# Patient Record
Sex: MALE | URBAN - METROPOLITAN AREA
[De-identification: names, ages, dates, MRNs, and addresses within clinical notes are randomized per-mention and may not be internally consistent; named-entity substitution may affect disease eponyms.]

---

## 2022-10-24 ENCOUNTER — OFFICE VISIT (OUTPATIENT)
Dept: FAMILY MEDICINE CLINIC | Age: 22
End: 2022-10-24

## 2022-10-24 VITALS
TEMPERATURE: 97.6 F | DIASTOLIC BLOOD PRESSURE: 83 MMHG | RESPIRATION RATE: 18 BRPM | SYSTOLIC BLOOD PRESSURE: 130 MMHG | HEART RATE: 75 BPM | OXYGEN SATURATION: 98 % | WEIGHT: 175.6 LBS

## 2022-10-24 DIAGNOSIS — R09.82 PND (POST-NASAL DRIP): ICD-10-CM

## 2022-10-24 DIAGNOSIS — T48.5X5A NASAL CONGESTION DUE TO PROLONGED USE OF DECONGESTANTS: ICD-10-CM

## 2022-10-24 DIAGNOSIS — Z23 ENCOUNTER FOR IMMUNIZATION: ICD-10-CM

## 2022-10-24 DIAGNOSIS — R09.81 NASAL SINUS CONGESTION: ICD-10-CM

## 2022-10-24 DIAGNOSIS — H61.22 EXCESSIVE CERUMEN IN LEFT EAR CANAL: ICD-10-CM

## 2022-10-24 DIAGNOSIS — R06.2 WHEEZING: Primary | ICD-10-CM

## 2022-10-24 DIAGNOSIS — R09.81 NASAL CONGESTION DUE TO PROLONGED USE OF DECONGESTANTS: ICD-10-CM

## 2022-10-24 PROCEDURE — 90686 IIV4 VACC NO PRSV 0.5 ML IM: CPT | Performed by: STUDENT IN AN ORGANIZED HEALTH CARE EDUCATION/TRAINING PROGRAM

## 2022-10-24 PROCEDURE — 99203 OFFICE O/P NEW LOW 30 MIN: CPT | Performed by: STUDENT IN AN ORGANIZED HEALTH CARE EDUCATION/TRAINING PROGRAM

## 2022-10-24 PROCEDURE — 90471 IMMUNIZATION ADMIN: CPT | Performed by: STUDENT IN AN ORGANIZED HEALTH CARE EDUCATION/TRAINING PROGRAM

## 2022-10-24 RX ORDER — LEVOCETIRIZINE DIHYDROCHLORIDE 5 MG/1
5 TABLET, FILM COATED ORAL DAILY
Qty: 30 TABLET | Refills: 2 | Status: SHIPPED | OUTPATIENT
Start: 2022-10-24 | End: 2023-01-22

## 2022-10-24 RX ORDER — ALBUTEROL SULFATE 90 UG/1
2 AEROSOL, METERED RESPIRATORY (INHALATION)
Qty: 18 G | Refills: 4 | Status: SHIPPED | OUTPATIENT
Start: 2022-10-24

## 2022-10-24 NOTE — PROGRESS NOTES
HISTORY OF PRESENT ILLNESS  Claudia Che is a 23 y.o. male. 25-year-old gentleman who presents today for evaluation with complaints of intermittent nasal congestion x2 weeks likely 2/2 medication overuse, and history of wheezing. Patient has a history of exercise-induced asthma. 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 Patient states he has had to use his grandmothers inhaler which has helped relieve his symptoms of wheezing. Patient at this time denies any SOB, coughing, suggestive,    Ear Fullness   The history is provided by the Patient. This is a new problem. The current episode started more than 1 week ago. The problem occurs daily. Progression since onset: wax and wanes. Patient complains that the left ear is affected. There has been no fever. The pain is at a severity of 0/10. The patient is experiencing no pain. Pertinent negatives include no ear discharge, no hearing loss, no rhinorrhea, no sore throat and no cough. Wheezing   The history is provided by the Patient. This is a new problem. Episode onset: > 4 weeks. Episode frequency: intermittent. Pertinent negatives include no fever, no ear pain, no rhinorrhea, no sore throat, no swollen glands, no cough and no sputum production. The problem's precipitants include pollens and exercise. His past medical history is significant for asthma (Exercise-induced). His past medical history does not include pneumonia. Review of Systems   Constitutional: Negative. Negative for fever. HENT:  Positive for congestion. Negative for ear discharge, ear pain, hearing loss, rhinorrhea and sore throat. Respiratory:  Positive for wheezing. Negative for cough, sputum production and shortness of breath. Cardiovascular: Negative. Gastrointestinal: Negative. Genitourinary: Negative. Musculoskeletal: Negative. Skin: Negative. Neurological: Negative.     Visit Vitals  /83 (BP 1 Location: Left upper arm, BP Patient Position: Sitting, BP Cuff Size: Adult)   Pulse 75   Temp 97.6 °F (36.4 °C) (Temporal)   Resp 18   Wt 175 lb 9.6 oz (79.7 kg)   SpO2 98%     Physical Exam  Vitals reviewed. Constitutional:       General: He is not in acute distress. Appearance: Normal appearance. He is normal weight. He is not ill-appearing, toxic-appearing or diaphoretic. HENT:      Head: Normocephalic and atraumatic. Right Ear: Ear canal and external ear normal. There is no impacted cerumen. Left Ear: Ear canal and external ear normal. There is impacted cerumen. Nose: Nose normal. No congestion or rhinorrhea. Mouth/Throat:      Mouth: Mucous membranes are moist.      Pharynx: Oropharynx is clear. No oropharyngeal exudate or posterior oropharyngeal erythema. Eyes:      General: No scleral icterus. Right eye: No discharge. Left eye: No discharge. Conjunctiva/sclera: Conjunctivae normal.   Cardiovascular:      Rate and Rhythm: Normal rate and regular rhythm. Pulses: Normal pulses. Heart sounds: Normal heart sounds. No murmur heard. No friction rub. No gallop. Pulmonary:      Effort: Pulmonary effort is normal.      Breath sounds: Normal breath sounds. No wheezing, rhonchi or rales. Musculoskeletal:         General: Normal range of motion. Cervical back: Normal range of motion and neck supple. Skin:     General: Skin is warm. Neurological:      Mental Status: He is alert and oriented to person, place, and time. ASSESSMENT and PLAN  Diagnoses and all orders for this visit:    1. Wheezing  67-year-old male presenting today with complaints of wheezing (intermittent) likely 2/2 exposure to irritants/air pollutants/allergies. Patient does have a prior history of exercise-induced asthma. Counseled patient on irritant avoidance to use a facemask. Counseled patient on use of albuterol 2 puffs every 6 hours.   Advised patient to call back for any increased/daily use of albuterol or if symptoms worsens. Patient confirms understanding.  -     albuterol (PROVENTIL HFA, VENTOLIN HFA, PROAIR HFA) 90 mcg/actuation inhaler; Take 2 Puffs by inhalation every six (6) hours as needed for Wheezing. 2. Nasal sinus congestion  Encourage use of Flonase 2 puffs into each nostrils daily (encouraged consistency of use)    -     levocetirizine (XYZAL) 5 mg tablet; Take 1 Tablet by mouth daily for 90 days. 3. Nasal congestion due to prolonged use of decongestants  Counseled patient to avoid using nasal decongestants more than 3 days. 4. PND (post-nasal drip)  See plan above for nasal congestion    5. Excessive cerumen in left ear canal  -     carbamide peroxide (DEBROX) 6.5 % otic solution; Administer 5 Drops in left ear two (2) times a day. 6. Encounter for immunization  -     INFLUENZA, FLUARIX, FLULAVAL, FLUZONE (AGE 6 MO+), AFLURIA(AGE 3Y+) IM, PF, 0.5 ML     Follow-up and Dispositions    Return in about 5 months (around 3/24/2023), or if symptoms worsen or fail to improve, for well visit.

## 2022-10-24 NOTE — PROGRESS NOTES
1. \"Have you been to the ER, urgent care clinic since your last visit? Hospitalized since your last visit? \" Yes When: March 26, 2022  Where: [facility]  Reason for visit: fever, vomitting, SOB    2. \"Have you seen or consulted any other health care providers outside of the [address] [place] [address] since your last visit? \" No     3. For patients aged 39-70: Has the patient had a colonoscopy / FIT/ Cologuard?  NA - based on age

## 2022-10-24 NOTE — LETTER
10/24/2022 3:26 PM    Mr. Velasquez Schultz  [address] [place] [address],  [id number]       To Whom It May Concern:     Velasquez Schultz is currently under the care of Francisca Dyer Dr. He was seen 10/24/2022. He will return to work/school on: 10/25/2022     If there are questions or concerns please have the patient contact our office.            Sincerely,        General Manuel, DO             Sincerely,      General Manuel, DO

## 2022-10-24 NOTE — LETTER
NOTIFICATION RETURN TO WORK / SCHOOL    10/24/2022 3:27 PM    Mr. Maria T Cline  [address]      To Whom It May Concern:    Maria T Cline is currently under the care of Francisca yDer Dr. He will return to work/school on: 10/25/2022    If there are questions or concerns please have the patient contact our office.         Sincerely,      Abilio Youngblood, DO

## 2024-03-01 ENCOUNTER — TELEPHONE (OUTPATIENT)
Age: 24
End: 2024-03-01